# Patient Record
Sex: MALE | Race: BLACK OR AFRICAN AMERICAN | NOT HISPANIC OR LATINO | Employment: FULL TIME | URBAN - METROPOLITAN AREA
[De-identification: names, ages, dates, MRNs, and addresses within clinical notes are randomized per-mention and may not be internally consistent; named-entity substitution may affect disease eponyms.]

---

## 2020-04-28 ENCOUNTER — TELEMEDICINE (OUTPATIENT)
Dept: FAMILY MEDICINE CLINIC | Facility: CLINIC | Age: 35
End: 2020-04-28

## 2020-04-28 DIAGNOSIS — R68.83 CHILLS: ICD-10-CM

## 2020-04-28 DIAGNOSIS — G43.809 OTHER MIGRAINE WITHOUT STATUS MIGRAINOSUS, NOT INTRACTABLE: ICD-10-CM

## 2020-04-28 DIAGNOSIS — J06.9 UPPER RESPIRATORY TRACT INFECTION, UNSPECIFIED TYPE: Primary | ICD-10-CM

## 2020-04-28 PROBLEM — G43.909 MIGRAINE: Status: ACTIVE | Noted: 2020-04-28

## 2020-04-28 PROCEDURE — 99213 OFFICE O/P EST LOW 20 MIN: CPT | Performed by: FAMILY MEDICINE

## 2020-04-28 NOTE — PROGRESS NOTES
Subjective   Emerson Rhodes Jr. is a 34 y.o. male.     Consent given  Visit via [employer]  Time 20 min    CC: sick since Saturday 4 days ago, felt like slight fever nausea, fatigued, indigestion, had chills. No vomiting, but migraine, sleeping a lot. Some sinus congestion.    History of Present Illness   : sick since Saturday 4 days ago, felt like slight fever nausea, fatigued, indigestion, had chills. No vomiting, but migraine, sleeping a lot. Some sinus congestion.    The following portions of the patient's history were reviewed and updated as appropriate: allergies, current medications, past family history, past medical history, past social history, past surgical history and problem list.    History reviewed. No pertinent past medical history.    History reviewed. No pertinent surgical history.    Family History   Problem Relation Age of Onset   • No Known Problems Mother        Social History     Socioeconomic History   • Marital status: Single     Spouse name: Not on file   • Number of children: Not on file   • Years of education: Not on file   • Highest education level: Not on file   Tobacco Use   • Smoking status: Never Smoker   • Smokeless tobacco: Never Used   Substance and Sexual Activity   • Alcohol use: Never     Frequency: Never   • Drug use: Never   • Sexual activity: Yes       No current outpatient medications on file prior to visit.     No current facility-administered medications on file prior to visit.        Review of Systems   Constitutional: Positive for chills, fatigue and fever.   Gastrointestinal: Positive for nausea.       No results found for this or any previous visit (from the past 4704 hour(s)).  Objective   There were no vitals filed for this visit.  There is no height or weight on file to calculate BMI.  Physical Exam      Assessment/Plan   Diagnoses and all orders for this visit:    Upper respiratory tract infection, unspecified type    Other migraine without status migrainosus, not  intractable    Chills    Pt was recommended for only symptomatic treatment OTC, Tylenol recommended.    Recommended to be tested for COViD 19 at a testing site.

## 2020-06-23 ENCOUNTER — OFFICE VISIT (OUTPATIENT)
Dept: FAMILY MEDICINE CLINIC | Facility: CLINIC | Age: 35
End: 2020-06-23

## 2020-06-23 VITALS
WEIGHT: 163.5 LBS | HEIGHT: 65 IN | DIASTOLIC BLOOD PRESSURE: 78 MMHG | OXYGEN SATURATION: 98 % | BODY MASS INDEX: 27.24 KG/M2 | TEMPERATURE: 97.5 F | SYSTOLIC BLOOD PRESSURE: 120 MMHG | HEART RATE: 68 BPM

## 2020-06-23 DIAGNOSIS — Z00.00 HEALTH MAINTENANCE EXAMINATION: Primary | ICD-10-CM

## 2020-06-23 PROCEDURE — 99395 PREV VISIT EST AGE 18-39: CPT | Performed by: FAMILY MEDICINE

## 2020-06-23 RX ORDER — NIACIN 100 MG
100 TABLET ORAL DAILY
COMMUNITY
End: 2020-06-23

## 2020-06-23 NOTE — PROGRESS NOTES
"Subjective   Emerson Rhodes Jr. is a 34 y.o. male.     Chief Complaint   Patient presents with   • biometric screening     discuss the numbers.       History of Present Illness     HERE FOR BIOMETRIC SCREENING,  DOING WELL  NO C/O  The following portions of the patient's history were reviewed and updated as appropriate: allergies, current medications, past family history, past medical history, past social history, past surgical history and problem list.    Past Medical History:   Diagnosis Date   • Hyperlipidemia        Past Surgical History:   Procedure Laterality Date   • WISDOM TOOTH EXTRACTION     • WRIST SURGERY         Family History   Problem Relation Age of Onset   • No Known Problems Mother    • No Known Problems Father        Social History     Socioeconomic History   • Marital status: Single     Spouse name: Not on file   • Number of children: Not on file   • Years of education: Not on file   • Highest education level: Not on file   Tobacco Use   • Smoking status: Never Smoker   • Smokeless tobacco: Never Used   Substance and Sexual Activity   • Alcohol use: Never     Frequency: Never   • Drug use: Never   • Sexual activity: Yes       Current Outpatient Medications on File Prior to Visit   Medication Sig Dispense Refill   • Loratadine (CLARITIN PO) Take  by mouth Daily.     • MULTIPLE VITAMINS-MINERALS PO Take  by mouth Daily.     • [DISCONTINUED] niacin 100 MG tablet Take 100 mg by mouth Daily.       No current facility-administered medications on file prior to visit.        Review of Systems   All other systems reviewed and are negative.      No results found for this or any previous visit (from the past 4704 hour(s)).  Objective   Vitals:    06/23/20 1303   BP: 120/78   Pulse: 68   Temp: 97.5 °F (36.4 °C)   SpO2: 98%   Weight: 74.2 kg (163 lb 8 oz)   Height: 165.1 cm (65\")     Body mass index is 27.21 kg/m².  Physical Exam   Constitutional: He appears well-developed and well-nourished. No distress. "   Cardiovascular: Normal rate and regular rhythm.   Pulmonary/Chest: Effort normal and breath sounds normal. No respiratory distress. He has no wheezes.   Skin: He is not diaphoretic.   Nursing note and vitals reviewed.        Assessment/Plan   Emerson was seen today for biometric screening.    Diagnoses and all orders for this visit:    Health maintenance examination  -     Comprehensive Metabolic Panel  -     Lipid Panel  -     CBC & Differential      Return in about 1 year (around 6/23/2021) for Annual physical.

## 2020-06-25 LAB
ALBUMIN SERPL-MCNC: 4.7 G/DL (ref 3.5–5.2)
ALBUMIN/GLOB SERPL: 1.6 G/DL
ALP SERPL-CCNC: 67 U/L (ref 39–117)
ALT SERPL-CCNC: 58 U/L (ref 1–41)
AST SERPL-CCNC: 26 U/L (ref 1–40)
BASOPHILS # BLD AUTO: 0.04 10*3/MM3 (ref 0–0.2)
BASOPHILS NFR BLD AUTO: 0.6 % (ref 0–1.5)
BILIRUB SERPL-MCNC: 0.8 MG/DL (ref 0.2–1.2)
BUN SERPL-MCNC: 11 MG/DL (ref 6–20)
BUN/CREAT SERPL: 10.2 (ref 7–25)
CALCIUM SERPL-MCNC: 10 MG/DL (ref 8.6–10.5)
CHLORIDE SERPL-SCNC: 100 MMOL/L (ref 98–107)
CHOLEST SERPL-MCNC: 233 MG/DL (ref 0–200)
CO2 SERPL-SCNC: 25.5 MMOL/L (ref 22–29)
CREAT SERPL-MCNC: 1.08 MG/DL (ref 0.76–1.27)
EOSINOPHIL # BLD AUTO: 0.2 10*3/MM3 (ref 0–0.4)
EOSINOPHIL NFR BLD AUTO: 2.8 % (ref 0.3–6.2)
ERYTHROCYTE [DISTWIDTH] IN BLOOD BY AUTOMATED COUNT: 13.8 % (ref 12.3–15.4)
GLOBULIN SER CALC-MCNC: 3 GM/DL
GLUCOSE SERPL-MCNC: 88 MG/DL (ref 65–99)
HCT VFR BLD AUTO: 47.2 % (ref 37.5–51)
HDLC SERPL-MCNC: 43 MG/DL (ref 40–60)
HGB BLD-MCNC: 15.6 G/DL (ref 13–17.7)
IMM GRANULOCYTES # BLD AUTO: 0.01 10*3/MM3 (ref 0–0.05)
IMM GRANULOCYTES NFR BLD AUTO: 0.1 % (ref 0–0.5)
LDLC SERPL CALC-MCNC: 166 MG/DL (ref 0–100)
LYMPHOCYTES # BLD AUTO: 3.14 10*3/MM3 (ref 0.7–3.1)
LYMPHOCYTES NFR BLD AUTO: 43.3 % (ref 19.6–45.3)
MCH RBC QN AUTO: 29.3 PG (ref 26.6–33)
MCHC RBC AUTO-ENTMCNC: 33.1 G/DL (ref 31.5–35.7)
MCV RBC AUTO: 88.6 FL (ref 79–97)
MONOCYTES # BLD AUTO: 0.63 10*3/MM3 (ref 0.1–0.9)
MONOCYTES NFR BLD AUTO: 8.7 % (ref 5–12)
NEUTROPHILS # BLD AUTO: 3.24 10*3/MM3 (ref 1.7–7)
NEUTROPHILS NFR BLD AUTO: 44.5 % (ref 42.7–76)
NRBC BLD AUTO-RTO: 0 /100 WBC (ref 0–0.2)
PLATELET # BLD AUTO: 307 10*3/MM3 (ref 140–450)
POTASSIUM SERPL-SCNC: 4.7 MMOL/L (ref 3.5–5.2)
PROT SERPL-MCNC: 7.7 G/DL (ref 6–8.5)
RBC # BLD AUTO: 5.33 10*6/MM3 (ref 4.14–5.8)
SODIUM SERPL-SCNC: 136 MMOL/L (ref 136–145)
TRIGL SERPL-MCNC: 122 MG/DL (ref 0–150)
VLDLC SERPL CALC-MCNC: 24.4 MG/DL
WBC # BLD AUTO: 7.26 10*3/MM3 (ref 3.4–10.8)

## 2021-04-16 ENCOUNTER — BULK ORDERING (OUTPATIENT)
Dept: CASE MANAGEMENT | Facility: OTHER | Age: 36
End: 2021-04-16

## 2021-04-16 DIAGNOSIS — Z23 IMMUNIZATION DUE: ICD-10-CM

## 2021-06-11 ENCOUNTER — IMMUNIZATION (OUTPATIENT)
Dept: VACCINE CLINIC | Facility: HOSPITAL | Age: 36
End: 2021-06-11

## 2021-06-11 PROCEDURE — 0001A: CPT | Performed by: INTERNAL MEDICINE

## 2021-06-11 PROCEDURE — 91300 HC SARSCOV02 VAC 30MCG/0.3ML IM: CPT | Performed by: INTERNAL MEDICINE

## 2021-06-15 ENCOUNTER — OFFICE VISIT (OUTPATIENT)
Dept: FAMILY MEDICINE CLINIC | Facility: CLINIC | Age: 36
End: 2021-06-15

## 2021-06-15 VITALS
SYSTOLIC BLOOD PRESSURE: 113 MMHG | HEART RATE: 86 BPM | HEIGHT: 65 IN | WEIGHT: 146 LBS | OXYGEN SATURATION: 98 % | DIASTOLIC BLOOD PRESSURE: 76 MMHG | TEMPERATURE: 98.9 F | BODY MASS INDEX: 24.32 KG/M2

## 2021-06-15 DIAGNOSIS — R07.89 ATYPICAL CHEST PAIN: ICD-10-CM

## 2021-06-15 DIAGNOSIS — Z11.59 ENCOUNTER FOR HEPATITIS C SCREENING TEST FOR LOW RISK PATIENT: ICD-10-CM

## 2021-06-15 DIAGNOSIS — H61.23 BILATERAL IMPACTED CERUMEN: ICD-10-CM

## 2021-06-15 DIAGNOSIS — Z00.00 HEALTH MAINTENANCE EXAMINATION: Primary | ICD-10-CM

## 2021-06-15 DIAGNOSIS — R07.2 PRECORDIAL PAIN: ICD-10-CM

## 2021-06-15 PROBLEM — J30.81 ALLERGIC RHINITIS DUE TO CAT HAIR: Status: ACTIVE | Noted: 2021-06-15

## 2021-06-15 PROBLEM — Z91.09 POLLEN ALLERGIES: Status: ACTIVE | Noted: 2021-06-15

## 2021-06-15 PROCEDURE — 69209 REMOVE IMPACTED EAR WAX UNI: CPT | Performed by: FAMILY MEDICINE

## 2021-06-15 PROCEDURE — 93000 ELECTROCARDIOGRAM COMPLETE: CPT | Performed by: FAMILY MEDICINE

## 2021-06-15 PROCEDURE — 99395 PREV VISIT EST AGE 18-39: CPT | Performed by: FAMILY MEDICINE

## 2021-06-15 PROCEDURE — 99213 OFFICE O/P EST LOW 20 MIN: CPT | Performed by: FAMILY MEDICINE

## 2021-06-15 NOTE — PROGRESS NOTES
"Emerson Rhodes Jr. is here today for an annual physical exam.     Eating a healthy diet. Exercising routinely.  yes  Sexual and gender orientation:h/s  Practicing safe sex?:yes    PHQ-2 Depression Screening  Little interest or pleasure in doing things? 0   Feeling down, depressed, or hopeless? 0   PHQ-2 Total Score 0        I have reviewed the patient's medical, family, and social history in detail and updated the computerized patient record.    Screening history:  Colonoscopy - n/a  Prostate - n/a  Metabolic - nolmal  Dental: up to date  Vision: due  Health Maintenance   Topic Date Due   • TDAP/TD VACCINES (2 - Tdap) 02/19/2008   • HEPATITIS C SCREENING  Never done   • LIPID PANEL  06/24/2021   • COVID-19 Vaccine (2 - Pfizer 2-dose series) 07/02/2021   • INFLUENZA VACCINE  08/01/2021   • ANNUAL PHYSICAL  06/16/2022   • Pneumococcal Vaccine 0-64  Aged Out         Review of Systems   Constitutional: Negative.    Respiratory: Positive for chest tightness.    Cardiovascular: Positive for palpitations.       /76   Pulse 86   Temp 98.9 °F (37.2 °C)   Ht 165.1 cm (65\")   Wt 66.2 kg (146 lb)   SpO2 98%   BMI 24.30 kg/m²      Physical Exam    Vital signs reviewed.  General appearance: No acute distress  Eyes: conjunctiva clear without erythema; pupils equally round and reactive  ENT: external ears and nose normal; hearing normal, oropharynx clear  Neck: supple; no thyromegaly  CV: normal rate and rhythm; no peripheral edema  Respiratory: normal respiratory effort; lungs clear to auscultation bilaterally  MSK: normal gait and station; no focal joint deformity or swelling  Skin: no rash or wounds; normal turgor  Neuro: cranial nerves 2-12 grossly intact; normal sensation to light touch  Psych: mood and affect normal; recent and remote memory intact    No visits with results within 2 Week(s) from this visit.   Latest known visit with results is:   Office Visit on 06/23/2020   Component Date Value Ref Range Status "   • Glucose 06/24/2020 88  65 - 99 mg/dL Final   • BUN 06/24/2020 11  6 - 20 mg/dL Final   • Creatinine 06/24/2020 1.08  0.76 - 1.27 mg/dL Final   • eGFR Non  Am 06/24/2020 79  >60 mL/min/1.73 Final   • eGFR African Am 06/24/2020 95  >60 mL/min/1.73 Final   • BUN/Creatinine Ratio 06/24/2020 10.2  7.0 - 25.0 Final   • Sodium 06/24/2020 136  136 - 145 mmol/L Final   • Potassium 06/24/2020 4.7  3.5 - 5.2 mmol/L Final   • Chloride 06/24/2020 100  98 - 107 mmol/L Final   • Total CO2 06/24/2020 25.5  22.0 - 29.0 mmol/L Final   • Calcium 06/24/2020 10.0  8.6 - 10.5 mg/dL Final   • Total Protein 06/24/2020 7.7  6.0 - 8.5 g/dL Final   • Albumin 06/24/2020 4.70  3.50 - 5.20 g/dL Final   • Globulin 06/24/2020 3.0  gm/dL Final   • A/G Ratio 06/24/2020 1.6  g/dL Final   • Total Bilirubin 06/24/2020 0.8  0.2 - 1.2 mg/dL Final   • Alkaline Phosphatase 06/24/2020 67  39 - 117 U/L Final   • AST (SGOT) 06/24/2020 26  1 - 40 U/L Final   • ALT (SGPT) 06/24/2020 58* 1 - 41 U/L Final   • Total Cholesterol 06/24/2020 233* 0 - 200 mg/dL Final   • Triglycerides 06/24/2020 122  0 - 150 mg/dL Final   • HDL Cholesterol 06/24/2020 43  40 - 60 mg/dL Final   • VLDL Cholesterol 06/24/2020 24.4  mg/dL Final   • LDL Cholesterol  06/24/2020 166* 0 - 100 mg/dL Final   • WBC 06/24/2020 7.26  3.40 - 10.80 10*3/mm3 Final   • RBC 06/24/2020 5.33  4.14 - 5.80 10*6/mm3 Final   • Hemoglobin 06/24/2020 15.6  13.0 - 17.7 g/dL Final   • Hematocrit 06/24/2020 47.2  37.5 - 51.0 % Final   • MCV 06/24/2020 88.6  79.0 - 97.0 fL Final   • MCH 06/24/2020 29.3  26.6 - 33.0 pg Final   • MCHC 06/24/2020 33.1  31.5 - 35.7 g/dL Final   • RDW 06/24/2020 13.8  12.3 - 15.4 % Final   • Platelets 06/24/2020 307  140 - 450 10*3/mm3 Final   • Neutrophil Rel % 06/24/2020 44.5  42.7 - 76.0 % Final   • Lymphocyte Rel % 06/24/2020 43.3  19.6 - 45.3 % Final   • Monocyte Rel % 06/24/2020 8.7  5.0 - 12.0 % Final   • Eosinophil Rel % 06/24/2020 2.8  0.3 - 6.2 % Final   • Basophil  Rel % 06/24/2020 0.6  0.0 - 1.5 % Final   • Neutrophils Absolute 06/24/2020 3.24  1.70 - 7.00 10*3/mm3 Final   • Lymphocytes Absolute 06/24/2020 3.14* 0.70 - 3.10 10*3/mm3 Final   • Monocytes Absolute 06/24/2020 0.63  0.10 - 0.90 10*3/mm3 Final   • Eosinophils Absolute 06/24/2020 0.20  0.00 - 0.40 10*3/mm3 Final   • Basophils Absolute 06/24/2020 0.04  0.00 - 0.20 10*3/mm3 Final   • Immature Granulocyte Rel % 06/24/2020 0.1  0.0 - 0.5 % Final   • Immature Grans Absolute 06/24/2020 0.01  0.00 - 0.05 10*3/mm3 Final   • nRBC 06/24/2020 0.0  0.0 - 0.2 /100 WBC Final          Current Outpatient Medications:   •  Loratadine (CLARITIN PO), Take  by mouth Daily., Disp: , Rfl:   •  MULTIPLE VITAMINS-MINERALS PO, Take  by mouth Daily., Disp: , Rfl:     Diagnoses and all orders for this visit:    1. Health maintenance examination (Primary)  -     Comprehensive Metabolic Panel  -     Lipid Panel  -     CBC & Differential  -     Hepatitis C Antibody    2. Encounter for hepatitis C screening test for low risk patient  -     Hepatitis C Antibody    3. Precordial pain  -     ECG 12 Lead    4. Atypical chest pain  -     ECG 12 Lead    5. Bilateral impacted cerumen  -     Ear Cerumen Removal    Other orders  -     Cancel: ECG 12 Lead      ECG 12 Lead    Date/Time: 6/15/2021 9:42 AM  Performed by: Mia Degroot MD  Authorized by: Mia Degroot MD   Comparison: not compared with previous ECG   Previous ECG: no previous ECG available  Rhythm: sinus rhythm    Clinical impression: normal ECG      Ear Cerumen Removal    Date/Time: 6/15/2021 9:43 AM  Performed by: Mia Degroot MD  Authorized by: Mia Degroot MD   Location details: right ear and left ear  Comments: Tolerated procedure well.  Canals are clear.    Procedure type: irrigation       Preventive guidance given  Encourage healthy diet and exercise.  Encourage patient to stay up to date on screening examinations as indicated based on age and risk factors. F/U  yearly.

## 2021-06-15 NOTE — PROGRESS NOTES
Subjective   Emerson Rhodes Jr. is a 35 y.o. male.     Chief Complaint   Patient presents with   • Follow-up       History of Present Illness     Besides coming today for health maintenance patient is complaining on strange sensations in his anterior chest.  He cannot characterize them they are not associated with physical activity.  But it is anterior chest pain.  No family history of heart disease or early heart attacks.    The following portions of the patient's history were reviewed and updated as appropriate: allergies, current medications, past family history, past medical history, past social history, past surgical history and problem list.    Past Medical History:   Diagnosis Date   • Hyperlipidemia        Past Surgical History:   Procedure Laterality Date   • WISDOM TOOTH EXTRACTION     • WRIST SURGERY         Family History   Problem Relation Age of Onset   • No Known Problems Mother    • No Known Problems Father        Social History     Socioeconomic History   • Marital status: Single     Spouse name: Not on file   • Number of children: Not on file   • Years of education: Not on file   • Highest education level: Not on file   Tobacco Use   • Smoking status: Never Smoker   • Smokeless tobacco: Never Used   Substance and Sexual Activity   • Alcohol use: Never   • Drug use: Never   • Sexual activity: Yes       Current Outpatient Medications on File Prior to Visit   Medication Sig Dispense Refill   • Loratadine (CLARITIN PO) Take  by mouth Daily.     • MULTIPLE VITAMINS-MINERALS PO Take  by mouth Daily.       No current facility-administered medications on file prior to visit.       Review of Systems   Respiratory: Positive for chest tightness.    Cardiovascular: Positive for chest pain.       No results found for this or any previous visit (from the past 4704 hour(s)).  Objective   Vitals:    06/15/21 0921 06/15/21 1019   BP: 131/77 113/76   Pulse: 86    Temp: 98.9 °F (37.2 °C)    SpO2: 98%    Weight: 66.2  "kg (146 lb)    Height: 165.1 cm (65\")      Body mass index is 24.3 kg/m².  Physical Exam  Vitals and nursing note reviewed.   Constitutional:       General: He is not in acute distress.     Appearance: He is well-developed. He is not diaphoretic.   Cardiovascular:      Rate and Rhythm: Normal rate and regular rhythm.   Pulmonary:      Effort: Pulmonary effort is normal. No respiratory distress.      Breath sounds: Normal breath sounds. No wheezing.           Diagnoses and all orders for this visit:    1. Health maintenance examination (Primary)  -     Comprehensive Metabolic Panel  -     Lipid Panel  -     CBC & Differential  -     Hepatitis C Antibody    2. Encounter for hepatitis C screening test for low risk patient  -     Hepatitis C Antibody    3. Precordial pain  -     ECG 12 Lead    4. Atypical chest pain  -     ECG 12 Lead    5. Bilateral impacted cerumen  -     Ear Cerumen Removal    Other orders  -     Cancel: ECG 12 Lead      Return if symptoms worsen or fail to improve.          "

## 2021-06-16 LAB
ALBUMIN SERPL-MCNC: 4.7 G/DL (ref 3.5–5.2)
ALBUMIN/GLOB SERPL: 1.9 G/DL
ALP SERPL-CCNC: 78 U/L (ref 39–117)
ALT SERPL-CCNC: 50 U/L (ref 1–41)
AST SERPL-CCNC: 22 U/L (ref 1–40)
BASOPHILS # BLD AUTO: 0.02 10*3/MM3 (ref 0–0.2)
BASOPHILS NFR BLD AUTO: 0.3 % (ref 0–1.5)
BILIRUB SERPL-MCNC: 0.6 MG/DL (ref 0–1.2)
BUN SERPL-MCNC: 17 MG/DL (ref 6–20)
BUN/CREAT SERPL: 15.6 (ref 7–25)
CALCIUM SERPL-MCNC: 9.7 MG/DL (ref 8.6–10.5)
CHLORIDE SERPL-SCNC: 101 MMOL/L (ref 98–107)
CHOLEST SERPL-MCNC: 159 MG/DL (ref 0–200)
CO2 SERPL-SCNC: 27.8 MMOL/L (ref 22–29)
CREAT SERPL-MCNC: 1.09 MG/DL (ref 0.76–1.27)
EOSINOPHIL # BLD AUTO: 0.15 10*3/MM3 (ref 0–0.4)
EOSINOPHIL NFR BLD AUTO: 2.5 % (ref 0.3–6.2)
ERYTHROCYTE [DISTWIDTH] IN BLOOD BY AUTOMATED COUNT: 13.1 % (ref 12.3–15.4)
GLOBULIN SER CALC-MCNC: 2.5 GM/DL
GLUCOSE SERPL-MCNC: 78 MG/DL (ref 65–99)
HCT VFR BLD AUTO: 46.3 % (ref 37.5–51)
HCV AB S/CO SERPL IA: <0.1 S/CO RATIO (ref 0–0.9)
HDLC SERPL-MCNC: 44 MG/DL (ref 40–60)
HGB BLD-MCNC: 15.1 G/DL (ref 13–17.7)
IMM GRANULOCYTES # BLD AUTO: 0.01 10*3/MM3 (ref 0–0.05)
IMM GRANULOCYTES NFR BLD AUTO: 0.2 % (ref 0–0.5)
LDLC SERPL CALC-MCNC: 98 MG/DL (ref 0–100)
LYMPHOCYTES # BLD AUTO: 2.45 10*3/MM3 (ref 0.7–3.1)
LYMPHOCYTES NFR BLD AUTO: 41 % (ref 19.6–45.3)
MCH RBC QN AUTO: 29 PG (ref 26.6–33)
MCHC RBC AUTO-ENTMCNC: 32.6 G/DL (ref 31.5–35.7)
MCV RBC AUTO: 89 FL (ref 79–97)
MONOCYTES # BLD AUTO: 0.48 10*3/MM3 (ref 0.1–0.9)
MONOCYTES NFR BLD AUTO: 8 % (ref 5–12)
NEUTROPHILS # BLD AUTO: 2.87 10*3/MM3 (ref 1.7–7)
NEUTROPHILS NFR BLD AUTO: 48 % (ref 42.7–76)
NRBC BLD AUTO-RTO: 0 /100 WBC (ref 0–0.2)
PLATELET # BLD AUTO: 265 10*3/MM3 (ref 140–450)
POTASSIUM SERPL-SCNC: 4.1 MMOL/L (ref 3.5–5.2)
PROT SERPL-MCNC: 7.2 G/DL (ref 6–8.5)
RBC # BLD AUTO: 5.2 10*6/MM3 (ref 4.14–5.8)
SODIUM SERPL-SCNC: 140 MMOL/L (ref 136–145)
TRIGL SERPL-MCNC: 88 MG/DL (ref 0–150)
VLDLC SERPL CALC-MCNC: 17 MG/DL (ref 5–40)
WBC # BLD AUTO: 5.98 10*3/MM3 (ref 3.4–10.8)

## 2021-07-02 ENCOUNTER — IMMUNIZATION (OUTPATIENT)
Dept: VACCINE CLINIC | Facility: HOSPITAL | Age: 36
End: 2021-07-02

## 2021-07-02 PROCEDURE — 0002A: CPT | Performed by: INTERNAL MEDICINE

## 2021-07-02 PROCEDURE — 91300 HC SARSCOV02 VAC 30MCG/0.3ML IM: CPT | Performed by: INTERNAL MEDICINE

## 2022-01-05 ENCOUNTER — APPOINTMENT (OUTPATIENT)
Dept: VACCINE CLINIC | Facility: HOSPITAL | Age: 37
End: 2022-01-05

## 2022-01-08 ENCOUNTER — IMMUNIZATION (OUTPATIENT)
Dept: VACCINE CLINIC | Facility: HOSPITAL | Age: 37
End: 2022-01-08

## 2022-01-08 PROCEDURE — 0004A HC ADM SARSCOV2 30MCG/0.3ML BOOSTER: CPT | Performed by: INTERNAL MEDICINE

## 2022-01-08 PROCEDURE — 91300 HC SARSCOV02 VAC 30MCG/0.3ML IM: CPT | Performed by: INTERNAL MEDICINE

## 2022-01-27 ENCOUNTER — OFFICE VISIT (OUTPATIENT)
Dept: FAMILY MEDICINE CLINIC | Facility: CLINIC | Age: 37
End: 2022-01-27

## 2022-01-27 VITALS
WEIGHT: 150 LBS | DIASTOLIC BLOOD PRESSURE: 80 MMHG | BODY MASS INDEX: 24.99 KG/M2 | TEMPERATURE: 98.4 F | OXYGEN SATURATION: 99 % | HEART RATE: 94 BPM | HEIGHT: 65 IN | SYSTOLIC BLOOD PRESSURE: 112 MMHG

## 2022-01-27 DIAGNOSIS — Z00.00 HEALTH MAINTENANCE EXAMINATION: Primary | ICD-10-CM

## 2022-01-27 PROCEDURE — 99395 PREV VISIT EST AGE 18-39: CPT | Performed by: FAMILY MEDICINE

## 2022-01-27 NOTE — PROGRESS NOTES
"Emerson Rhodes JrAntoine is here today for an annual physical exam.     Eating a healthy diet. Exercising routinely.    Sexual and gender orientation:  Practicing safe sex?:    PHQ-2 Depression Screening  Little interest or pleasure in doing things?  0   Feeling down, depressed, or hopeless?  0   PHQ-2 Total Score  0        I have reviewed the patient's medical, family, and social history in detail and updated the computerized patient record.    Screening history:  Colonoscopy - n/a  Prostate - n/a  Metabolic - nl  Vision due  Dental up to date    Health Maintenance   Topic Date Due   • TDAP/TD VACCINES (2 - Tdap) 02/19/2008   • INFLUENZA VACCINE  08/01/2021   • LIPID PANEL  06/15/2022   • ANNUAL PHYSICAL  06/16/2022   • HEPATITIS C SCREENING  Completed   • COVID-19 Vaccine  Completed   • Pneumococcal Vaccine 0-64  Aged Out       Review of Systems   Constitutional: Negative.        /80 (BP Location: Left arm, Patient Position: Sitting)   Pulse 94   Temp 98.4 °F (36.9 °C)   Ht 165.1 cm (65\")   Wt 68 kg (150 lb)   SpO2 99%   BMI 24.96 kg/m²      Physical Exam    Vital signs reviewed.  General appearance: No acute distress  Eyes: conjunctiva clear without erythema; pupils equally round and reactive  ENT: external ears and nose normal; hearing normal, oropharynx clear  Neck: supple; no thyromegaly  CV: normal rate and rhythm; no peripheral edema  Respiratory: normal respiratory effort; lungs clear to auscultation bilaterally  MSK: normal gait and station; no focal joint deformity or swelling  Skin: no rash or wounds; normal turgor  Neuro: cranial nerves 2-12 grossly intact; normal sensation to light touch  Psych: mood and affect normal; recent and remote memory intact    No visits with results within 2 Week(s) from this visit.   Latest known visit with results is:   Office Visit on 06/15/2021   Component Date Value Ref Range Status   • Glucose 06/15/2021 78  65 - 99 mg/dL Final   • BUN 06/15/2021 17  6 - 20 mg/dL " Final   • Creatinine 06/15/2021 1.09  0.76 - 1.27 mg/dL Final   • eGFR Non  Am 06/15/2021 77  >60 mL/min/1.73 Final    Comment: GFR Normal >60  Chronic Kidney Disease <60  Kidney Failure <15     • eGFR  Am 06/15/2021 94  >60 mL/min/1.73 Final   • BUN/Creatinine Ratio 06/15/2021 15.6  7.0 - 25.0 Final   • Sodium 06/15/2021 140  136 - 145 mmol/L Final   • Potassium 06/15/2021 4.1  3.5 - 5.2 mmol/L Final   • Chloride 06/15/2021 101  98 - 107 mmol/L Final   • Total CO2 06/15/2021 27.8  22.0 - 29.0 mmol/L Final   • Calcium 06/15/2021 9.7  8.6 - 10.5 mg/dL Final   • Total Protein 06/15/2021 7.2  6.0 - 8.5 g/dL Final   • Albumin 06/15/2021 4.70  3.50 - 5.20 g/dL Final   • Globulin 06/15/2021 2.5  gm/dL Final   • A/G Ratio 06/15/2021 1.9  g/dL Final   • Total Bilirubin 06/15/2021 0.6  0.0 - 1.2 mg/dL Final   • Alkaline Phosphatase 06/15/2021 78  39 - 117 U/L Final   • AST (SGOT) 06/15/2021 22  1 - 40 U/L Final   • ALT (SGPT) 06/15/2021 50* 1 - 41 U/L Final   • Total Cholesterol 06/15/2021 159  0 - 200 mg/dL Final    Comment: Cholesterol Reference Ranges  (U.S. Department of Health and Human Services ATP III  Classifications)  Desirable          <200 mg/dL  Borderline High    200-239 mg/dL  High Risk          >240 mg/dL  Triglyceride Reference Ranges  (U.S. Department of Health and Human Services ATP III  Classifications)  Normal           <150 mg/dL  Borderline High  150-199 mg/dL  High             200-499 mg/dL  Very High        >500 mg/dL  HDL Reference Ranges  (U.S. Department of Health and Human Services ATP III  Classifcations)  Low     <40 mg/dl (major risk factor for CHD)  High    >60 mg/dl ('negative' risk factor for CHD)  LDL Reference Ranges  (U.S. Department of Health and Human Services ATP III  Classifcations)  Optimal          <100 mg/dL  Near Optimal     100-129 mg/dL  Borderline High  130-159 mg/dL  High             160-189 mg/dL  Very High        >189 mg/dL     • Triglycerides 06/15/2021 88  0  - 150 mg/dL Final   • HDL Cholesterol 06/15/2021 44  40 - 60 mg/dL Final   • VLDL Cholesterol Jeremiah 06/15/2021 17  5 - 40 mg/dL Final   • LDL Chol Calc ([facility]) 06/15/2021 98  0 - 100 mg/dL Final   • WBC 06/15/2021 5.98  3.40 - 10.80 10*3/mm3 Final   • RBC 06/15/2021 5.20  4.14 - 5.80 10*6/mm3 Final   • Hemoglobin 06/15/2021 15.1  13.0 - 17.7 g/dL Final   • Hematocrit 06/15/2021 46.3  37.5 - 51.0 % Final   • MCV 06/15/2021 89.0  79.0 - 97.0 fL Final   • MCH 06/15/2021 29.0  26.6 - 33.0 pg Final   • MCHC 06/15/2021 32.6  31.5 - 35.7 g/dL Final   • RDW 06/15/2021 13.1  12.3 - 15.4 % Final   • Platelets 06/15/2021 265  140 - 450 10*3/mm3 Final   • Neutrophil Rel % 06/15/2021 48.0  42.7 - 76.0 % Final   • Lymphocyte Rel % 06/15/2021 41.0  19.6 - 45.3 % Final   • Monocyte Rel % 06/15/2021 8.0  5.0 - 12.0 % Final   • Eosinophil Rel % 06/15/2021 2.5  0.3 - 6.2 % Final   • Basophil Rel % 06/15/2021 0.3  0.0 - 1.5 % Final   • Neutrophils Absolute 06/15/2021 2.87  1.70 - 7.00 10*3/mm3 Final   • Lymphocytes Absolute 06/15/2021 2.45  0.70 - 3.10 10*3/mm3 Final   • Monocytes Absolute 06/15/2021 0.48  0.10 - 0.90 10*3/mm3 Final   • Eosinophils Absolute 06/15/2021 0.15  0.00 - 0.40 10*3/mm3 Final   • Basophils Absolute 06/15/2021 0.02  0.00 - 0.20 10*3/mm3 Final   • Immature Granulocyte Rel % 06/15/2021 0.2  0.0 - 0.5 % Final   • Immature Grans Absolute 06/15/2021 0.01  0.00 - 0.05 10*3/mm3 Final   • nRBC 06/15/2021 0.0  0.0 - 0.2 /100 WBC Final   • Hep C Virus Ab 06/15/2021 <0.1  0.0 - 0.9 s/co ratio Final    Comment:                                   Negative:     < 0.8                               Indeterminate: 0.8 - 0.9                                    Positive:     > 0.9   The CDC recommends that a positive HCV antibody result   be followed up with a HCV Nucleic Acid Amplification   test (493738).            Current Outpatient Medications:   •  Loratadine (CLARITIN PO), Take  by mouth Daily., Disp: , Rfl:   •  MULTIPLE  VITAMINS-MINERALS PO, Take  by mouth Daily., Disp: , Rfl:     Diagnoses and all orders for this visit:    1. Health maintenance examination (Primary)  -     Comprehensive Metabolic Panel  -     Lipid Panel  -     CBC & Differential      Preventive guidance given  Encourage healthy diet and exercise.  Encourage patient to stay up to date on screening examinations as indicated based on age and risk factors. F/U yearly.

## 2022-01-28 LAB
ALBUMIN SERPL-MCNC: 4.5 G/DL (ref 4–5)
ALBUMIN/GLOB SERPL: 1.6 {RATIO} (ref 1.2–2.2)
ALP SERPL-CCNC: 72 IU/L (ref 44–121)
ALT SERPL-CCNC: 25 IU/L (ref 0–44)
AST SERPL-CCNC: 19 IU/L (ref 0–40)
BASOPHILS # BLD AUTO: 0 X10E3/UL (ref 0–0.2)
BASOPHILS NFR BLD AUTO: 1 %
BILIRUB SERPL-MCNC: 0.7 MG/DL (ref 0–1.2)
BUN SERPL-MCNC: 29 MG/DL (ref 6–20)
BUN/CREAT SERPL: 24 (ref 9–20)
CALCIUM SERPL-MCNC: 9.5 MG/DL (ref 8.7–10.2)
CHLORIDE SERPL-SCNC: 100 MMOL/L (ref 96–106)
CHOLEST SERPL-MCNC: 187 MG/DL (ref 100–199)
CO2 SERPL-SCNC: 27 MMOL/L (ref 20–29)
CREAT SERPL-MCNC: 1.23 MG/DL (ref 0.76–1.27)
EOSINOPHIL # BLD AUTO: 0.1 X10E3/UL (ref 0–0.4)
EOSINOPHIL NFR BLD AUTO: 2 %
ERYTHROCYTE [DISTWIDTH] IN BLOOD BY AUTOMATED COUNT: 13 % (ref 11.6–15.4)
GLOBULIN SER CALC-MCNC: 2.8 G/DL (ref 1.5–4.5)
GLUCOSE SERPL-MCNC: 88 MG/DL (ref 65–99)
HCT VFR BLD AUTO: 48.3 % (ref 37.5–51)
HDLC SERPL-MCNC: 48 MG/DL
HGB BLD-MCNC: 15.5 G/DL (ref 13–17.7)
IMM GRANULOCYTES # BLD AUTO: 0 X10E3/UL (ref 0–0.1)
IMM GRANULOCYTES NFR BLD AUTO: 0 %
LDLC SERPL CALC-MCNC: 125 MG/DL (ref 0–99)
LYMPHOCYTES # BLD AUTO: 2.2 X10E3/UL (ref 0.7–3.1)
LYMPHOCYTES NFR BLD AUTO: 35 %
MCH RBC QN AUTO: 28.5 PG (ref 26.6–33)
MCHC RBC AUTO-ENTMCNC: 32.1 G/DL (ref 31.5–35.7)
MCV RBC AUTO: 89 FL (ref 79–97)
MONOCYTES # BLD AUTO: 0.6 X10E3/UL (ref 0.1–0.9)
MONOCYTES NFR BLD AUTO: 9 %
NEUTROPHILS # BLD AUTO: 3.5 X10E3/UL (ref 1.4–7)
NEUTROPHILS NFR BLD AUTO: 53 %
PLATELET # BLD AUTO: 314 X10E3/UL (ref 150–450)
POTASSIUM SERPL-SCNC: 4.5 MMOL/L (ref 3.5–5.2)
PROT SERPL-MCNC: 7.3 G/DL (ref 6–8.5)
RBC # BLD AUTO: 5.43 X10E6/UL (ref 4.14–5.8)
SODIUM SERPL-SCNC: 139 MMOL/L (ref 134–144)
TRIGL SERPL-MCNC: 74 MG/DL (ref 0–149)
VLDLC SERPL CALC-MCNC: 14 MG/DL (ref 5–40)
WBC # BLD AUTO: 6.5 X10E3/UL (ref 3.4–10.8)

## 2022-10-11 ENCOUNTER — TELEPHONE (OUTPATIENT)
Dept: FAMILY MEDICINE CLINIC | Facility: CLINIC | Age: 37
End: 2022-10-11

## 2022-10-11 ENCOUNTER — OFFICE VISIT (OUTPATIENT)
Dept: FAMILY MEDICINE CLINIC | Facility: CLINIC | Age: 37
End: 2022-10-11

## 2022-10-11 VITALS
BODY MASS INDEX: 25.62 KG/M2 | SYSTOLIC BLOOD PRESSURE: 111 MMHG | HEART RATE: 82 BPM | DIASTOLIC BLOOD PRESSURE: 76 MMHG | TEMPERATURE: 96.9 F | HEIGHT: 65 IN | WEIGHT: 153.8 LBS | OXYGEN SATURATION: 98 %

## 2022-10-11 DIAGNOSIS — Z23 IMMUNIZATION DUE: ICD-10-CM

## 2022-10-11 DIAGNOSIS — E78.5 DYSLIPIDEMIA: Primary | ICD-10-CM

## 2022-10-11 PROCEDURE — 90686 IIV4 VACC NO PRSV 0.5 ML IM: CPT | Performed by: FAMILY MEDICINE

## 2022-10-11 PROCEDURE — 90471 IMMUNIZATION ADMIN: CPT | Performed by: FAMILY MEDICINE

## 2022-10-11 PROCEDURE — 99213 OFFICE O/P EST LOW 20 MIN: CPT | Performed by: FAMILY MEDICINE

## 2022-10-11 NOTE — TELEPHONE ENCOUNTER
Caller: Emerson Rhodes Jr.    Relationship: Self    Best call back number: [phone]    What form or medical record are you requesting: OFF WORK NOTE FOR TODAY    Who is requesting this form or medical record from you: WORK    How would you like to receive the form or medical records (pick-up, mail, fax): [employer]    Timeframe paperwork needed: ASAP    Additional notes: PLEASE CALL WITH ANY FURTHER QUESTIONS AND TO ADVISE WHEN UPLOADED

## 2022-10-11 NOTE — PROGRESS NOTES
"Subjective   Emerson Rhodes Jr. is a 36 y.o. male.     Chief Complaint   Patient presents with   • Hyperlipidemia       History of Present Illness     Follow-up on mildly elevated cholesterol, LDL.  Patient is worried that he has been less accurate with his diet eating a lot of sweets and he wants to have his blood work checked today.  His blood pressure today is a little bit on borderline elevated side however repeated blood pressure was normal    The following portions of the patient's history were reviewed and updated as appropriate: allergies, current medications, past family history, past medical history, past social history, past surgical history and problem list.    Past Medical History:   Diagnosis Date   • Hyperlipidemia        Past Surgical History:   Procedure Laterality Date   • WISDOM TOOTH EXTRACTION     • WRIST SURGERY         Family History   Problem Relation Age of Onset   • No Known Problems Mother    • No Known Problems Father        Social History     Socioeconomic History   • Marital status: Single   Tobacco Use   • Smoking status: Never   • Smokeless tobacco: Never   Substance and Sexual Activity   • Alcohol use: Never   • Drug use: Never   • Sexual activity: Yes       Current Outpatient Medications on File Prior to Visit   Medication Sig Dispense Refill   • Loratadine (CLARITIN PO) Take  by mouth Daily.     • MULTIPLE VITAMINS-MINERALS PO Take  by mouth Daily.       No current facility-administered medications on file prior to visit.       Review of Systems   Constitutional: Negative.        No results found for this or any previous visit (from the past 4704 hour(s)).  Objective   Vitals:    10/11/22 0933   BP: 140/83   BP Location: Left arm   Patient Position: Sitting   Cuff Size: Adult   Pulse: 82   Temp: 96.9 °F (36.1 °C)   SpO2: 98%   Weight: 69.8 kg (153 lb 12.8 oz)   Height: 165.1 cm (65\")     Body mass index is 25.59 kg/m².  Physical Exam  Vitals and nursing note reviewed. "   Constitutional:       General: He is not in acute distress.     Appearance: He is well-developed. He is not diaphoretic.   Cardiovascular:      Rate and Rhythm: Normal rate and regular rhythm.   Pulmonary:      Effort: Pulmonary effort is normal. No respiratory distress.      Breath sounds: Normal breath sounds. No wheezing.           Diagnoses and all orders for this visit:    1. Dyslipidemia (Primary)  -     Comprehensive Metabolic Panel  -     Lipid Panel  -     CBC & Differential    2. Immunization due  -     FluLaval/Fluzone >6 mos ([id number])      Return in about 6 months (around 4/11/2023).

## 2022-10-28 ENCOUNTER — CLINICAL SUPPORT (OUTPATIENT)
Dept: FAMILY MEDICINE CLINIC | Facility: CLINIC | Age: 37
End: 2022-10-28

## 2022-10-28 DIAGNOSIS — Z23 IMMUNIZATION DUE: Primary | ICD-10-CM

## 2022-10-28 PROCEDURE — 91312 COVID-19 (PFIZER) BIVALENT BOOSTER 12+YRS: CPT | Performed by: FAMILY MEDICINE

## 2022-10-28 PROCEDURE — 0124A PR ADM SARSCOV2 30MCG/0.3ML BST: CPT | Performed by: FAMILY MEDICINE

## 2022-10-29 LAB
ALBUMIN SERPL-MCNC: 4.6 G/DL (ref 3.5–5.2)
ALBUMIN/GLOB SERPL: 1.8 G/DL
ALP SERPL-CCNC: 74 U/L (ref 39–117)
ALT SERPL-CCNC: 18 U/L (ref 1–41)
AST SERPL-CCNC: 22 U/L (ref 1–40)
BASOPHILS # BLD AUTO: 0.02 10*3/MM3 (ref 0–0.2)
BASOPHILS NFR BLD AUTO: 0.3 % (ref 0–1.5)
BILIRUB SERPL-MCNC: 0.8 MG/DL (ref 0–1.2)
BUN SERPL-MCNC: 10 MG/DL (ref 6–20)
BUN/CREAT SERPL: 8.5 (ref 7–25)
CALCIUM SERPL-MCNC: 9.5 MG/DL (ref 8.6–10.5)
CHLORIDE SERPL-SCNC: 104 MMOL/L (ref 98–107)
CHOLEST SERPL-MCNC: 188 MG/DL (ref 0–200)
CO2 SERPL-SCNC: 30.9 MMOL/L (ref 22–29)
CREAT SERPL-MCNC: 1.18 MG/DL (ref 0.76–1.27)
EGFRCR SERPLBLD CKD-EPI 2021: 82.5 ML/MIN/1.73
EOSINOPHIL # BLD AUTO: 0.17 10*3/MM3 (ref 0–0.4)
EOSINOPHIL NFR BLD AUTO: 3 % (ref 0.3–6.2)
ERYTHROCYTE [DISTWIDTH] IN BLOOD BY AUTOMATED COUNT: 13.1 % (ref 12.3–15.4)
GLOBULIN SER CALC-MCNC: 2.6 GM/DL
GLUCOSE SERPL-MCNC: 82 MG/DL (ref 65–99)
HCT VFR BLD AUTO: 49 % (ref 37.5–51)
HDLC SERPL-MCNC: 48 MG/DL (ref 40–60)
HGB BLD-MCNC: 16.1 G/DL (ref 13–17.7)
IMM GRANULOCYTES # BLD AUTO: 0.02 10*3/MM3 (ref 0–0.05)
IMM GRANULOCYTES NFR BLD AUTO: 0.3 % (ref 0–0.5)
LDLC SERPL CALC-MCNC: 126 MG/DL (ref 0–100)
LYMPHOCYTES # BLD AUTO: 2.25 10*3/MM3 (ref 0.7–3.1)
LYMPHOCYTES NFR BLD AUTO: 39.2 % (ref 19.6–45.3)
MCH RBC QN AUTO: 29.5 PG (ref 26.6–33)
MCHC RBC AUTO-ENTMCNC: 32.9 G/DL (ref 31.5–35.7)
MCV RBC AUTO: 89.9 FL (ref 79–97)
MONOCYTES # BLD AUTO: 0.52 10*3/MM3 (ref 0.1–0.9)
MONOCYTES NFR BLD AUTO: 9.1 % (ref 5–12)
NEUTROPHILS # BLD AUTO: 2.76 10*3/MM3 (ref 1.7–7)
NEUTROPHILS NFR BLD AUTO: 48.1 % (ref 42.7–76)
NRBC BLD AUTO-RTO: 0 /100 WBC (ref 0–0.2)
PLATELET # BLD AUTO: 272 10*3/MM3 (ref 140–450)
POTASSIUM SERPL-SCNC: 4.6 MMOL/L (ref 3.5–5.2)
PROT SERPL-MCNC: 7.2 G/DL (ref 6–8.5)
RBC # BLD AUTO: 5.45 10*6/MM3 (ref 4.14–5.8)
SODIUM SERPL-SCNC: 141 MMOL/L (ref 136–145)
TRIGL SERPL-MCNC: 76 MG/DL (ref 0–150)
VLDLC SERPL CALC-MCNC: 14 MG/DL (ref 5–40)
WBC # BLD AUTO: 5.74 10*3/MM3 (ref 3.4–10.8)

## 2023-03-31 ENCOUNTER — OFFICE VISIT (OUTPATIENT)
Dept: FAMILY MEDICINE CLINIC | Facility: CLINIC | Age: 38
End: 2023-03-31
Payer: COMMERCIAL

## 2023-03-31 VITALS
SYSTOLIC BLOOD PRESSURE: 119 MMHG | HEART RATE: 86 BPM | TEMPERATURE: 97.3 F | BODY MASS INDEX: 25.63 KG/M2 | OXYGEN SATURATION: 98 % | DIASTOLIC BLOOD PRESSURE: 83 MMHG | WEIGHT: 154 LBS

## 2023-03-31 DIAGNOSIS — E78.5 DYSLIPIDEMIA: ICD-10-CM

## 2023-03-31 DIAGNOSIS — Z23 ENCOUNTER FOR ADMINISTRATION OF VACCINE: ICD-10-CM

## 2023-03-31 DIAGNOSIS — Z00.00 ANNUAL PHYSICAL EXAM: Primary | ICD-10-CM

## 2023-03-31 DIAGNOSIS — Z11.3 SCREEN FOR STD (SEXUALLY TRANSMITTED DISEASE): ICD-10-CM

## 2023-03-31 PROBLEM — J06.9 UPPER RESPIRATORY TRACT INFECTION: Status: RESOLVED | Noted: 2020-04-28 | Resolved: 2023-03-31

## 2023-03-31 PROBLEM — H61.23 BILATERAL IMPACTED CERUMEN: Status: RESOLVED | Noted: 2021-06-15 | Resolved: 2023-03-31

## 2023-03-31 PROBLEM — R68.83 CHILLS: Status: RESOLVED | Noted: 2020-04-28 | Resolved: 2023-03-31

## 2023-03-31 NOTE — PROGRESS NOTES
"Chief Complaint  Dyslipidemia (New patient, patient would like to discuss biometric screening. )    Subjective        Emerson Rhodes Jr. presents to [facility]  History of Present Illness     Doing well today. Here today for check up. He had a biometric screening at work in February and his triglycerides were high. Has a weakness with sweets sometimes. Last triglycerides here were normal in October. Had stopped exercising as much. Had a death in the family right before the holidays and he was having a rough time. Is doing some better. Does see a therapist to help. Is currently sexually active, with female partners.     Objective   Vital Signs:  /83 (BP Location: Right arm, Patient Position: Sitting, Cuff Size: Large Adult)   Pulse 86   Temp 97.3 °F (36.3 °C)   Wt 69.9 kg (154 lb)   SpO2 98%   BMI 25.63 kg/m²   Estimated body mass index is 25.63 kg/m² as calculated from the following:    Height as of 10/11/22: 165.1 cm (65\").    Weight as of this encounter: 69.9 kg (154 lb).             Physical Exam  Vitals and nursing note reviewed.   Constitutional:       General: He is not in acute distress.     Appearance: Normal appearance. He is not ill-appearing.   HENT:      Head: Normocephalic and atraumatic.      Nose: Nose normal.      Mouth/Throat:      Mouth: Mucous membranes are moist.   Eyes:      Extraocular Movements: Extraocular movements intact.      Conjunctiva/sclera: Conjunctivae normal.   Cardiovascular:      Rate and Rhythm: Normal rate and regular rhythm.      Heart sounds: Normal heart sounds. No murmur heard.    No gallop.   Pulmonary:      Effort: Pulmonary effort is normal. No respiratory distress.      Breath sounds: Normal breath sounds. No stridor. No wheezing, rhonchi or rales.   Chest:      Chest wall: No tenderness.   Skin:     General: Skin is warm and dry.   Neurological:      General: No focal deficit present.      Mental Status: He is alert and oriented " to person, place, and time. Mental status is at baseline.   Psychiatric:         Mood and Affect: Mood normal.         Behavior: Behavior normal.         Thought Content: Thought content normal.         Judgment: Judgment normal.        Result Review :                   Assessment and Plan   Diagnoses and all orders for this visit:    1. Annual physical exam (Primary)  Comments:  Discussed preventative screenings and vaccines today.  Discussed diet and exercise.  Overall patient doing well.  We will repeat triglycerides today.  Orders:  -     Lipid panel  -     Tdap Vaccine Greater Than or Equal To 8yo IM  -     Comprehensive metabolic panel    2. Dyslipidemia  -     Lipid panel    3. Encounter for administration of vaccine  -     Tdap Vaccine Greater Than or Equal To 8yo IM    4. Screen for STD (sexually transmitted disease)  -     Chlamydia trachomatis, Neisseria gonorrhoeae, PCR - Urine, Urine, Random Void; Future  -     Hepatitis C RNA, quantitative, PCR (graph)  -     HSV 1 and 2-Specific Ab, IgG  -     RPR  -     HIV-1/O/2 ANTIGEN/ANTIBODY, 4TH GENERATION  -     Chlamydia trachomatis, Neisseria gonorrhoeae, PCR - Urine, Urine, Random Void             Follow Up   Return in about 6 months (around 9/30/2023).  Patient was given instructions and counseling regarding his condition or for health maintenance advice. Please see specific information pulled into the AVS if appropriate.

## 2023-04-03 LAB
ALBUMIN SERPL-MCNC: 4.4 G/DL (ref 4–5)
ALBUMIN/GLOB SERPL: 1.8 {RATIO} (ref 1.2–2.2)
ALP SERPL-CCNC: 69 IU/L (ref 44–121)
ALT SERPL-CCNC: 20 IU/L (ref 0–44)
AST SERPL-CCNC: 19 IU/L (ref 0–40)
BILIRUB SERPL-MCNC: 0.5 MG/DL (ref 0–1.2)
BUN SERPL-MCNC: 11 MG/DL (ref 6–20)
BUN/CREAT SERPL: 9 (ref 9–20)
CALCIUM SERPL-MCNC: 9.6 MG/DL (ref 8.7–10.2)
CHLORIDE SERPL-SCNC: 102 MMOL/L (ref 96–106)
CHOLEST SERPL-MCNC: 155 MG/DL (ref 100–199)
CO2 SERPL-SCNC: 23 MMOL/L (ref 20–29)
CREAT SERPL-MCNC: 1.21 MG/DL (ref 0.76–1.27)
EGFRCR SERPLBLD CKD-EPI 2021: 80 ML/MIN/1.73
GLOBULIN SER CALC-MCNC: 2.5 G/DL (ref 1.5–4.5)
GLUCOSE SERPL-MCNC: 82 MG/DL (ref 70–99)
HCV RNA SERPL NAA+PROBE-ACNC: NORMAL IU/ML
HDLC SERPL-MCNC: 42 MG/DL
HIV 1+2 AB+HIV1 P24 AG SERPL QL IA: NON REACTIVE
HSV1 IGG SER IA-ACNC: <0.91 INDEX (ref 0–0.9)
HSV2 IGG SER IA-ACNC: <0.91 INDEX (ref 0–0.9)
LDLC SERPL CALC-MCNC: 102 MG/DL (ref 0–99)
POTASSIUM SERPL-SCNC: 4.2 MMOL/L (ref 3.5–5.2)
PROT SERPL-MCNC: 6.9 G/DL (ref 6–8.5)
RPR SER QL: NON REACTIVE
SODIUM SERPL-SCNC: 141 MMOL/L (ref 134–144)
TEST INFORMATION: NORMAL
TRIGL SERPL-MCNC: 53 MG/DL (ref 0–149)
VLDLC SERPL CALC-MCNC: 11 MG/DL (ref 5–40)

## 2023-04-04 LAB
C TRACH RRNA SPEC QL NAA+PROBE: NEGATIVE
N GONORRHOEA RRNA SPEC QL NAA+PROBE: NEGATIVE

## 2023-04-06 ENCOUNTER — PATIENT ROUNDING (BHMG ONLY) (OUTPATIENT)
Dept: FAMILY MEDICINE CLINIC | Facility: CLINIC | Age: 38
End: 2023-04-06
Payer: COMMERCIAL

## 2023-04-06 NOTE — PROGRESS NOTES
• A My-Chart message has been sent to the patient for PATIENT ROUNDING with [facility]

## 2024-09-20 ENCOUNTER — OFFICE VISIT (OUTPATIENT)
Dept: FAMILY MEDICINE CLINIC | Facility: CLINIC | Age: 39
End: 2024-09-20
Payer: COMMERCIAL

## 2024-09-20 VITALS
BODY MASS INDEX: 28.32 KG/M2 | DIASTOLIC BLOOD PRESSURE: 82 MMHG | OXYGEN SATURATION: 99 % | WEIGHT: 170 LBS | HEART RATE: 86 BPM | TEMPERATURE: 97.8 F | HEIGHT: 65 IN | SYSTOLIC BLOOD PRESSURE: 122 MMHG

## 2024-09-20 DIAGNOSIS — G43.809 OTHER MIGRAINE WITHOUT STATUS MIGRAINOSUS, NOT INTRACTABLE: ICD-10-CM

## 2024-09-20 DIAGNOSIS — E78.5 DYSLIPIDEMIA: ICD-10-CM

## 2024-09-20 DIAGNOSIS — Z00.00 ANNUAL PHYSICAL EXAM: Primary | ICD-10-CM

## 2024-09-20 DIAGNOSIS — Z72.51 HIGH RISK SEXUAL BEHAVIOR, UNSPECIFIED TYPE: ICD-10-CM

## 2024-09-20 PROCEDURE — 99395 PREV VISIT EST AGE 18-39: CPT | Performed by: STUDENT IN AN ORGANIZED HEALTH CARE EDUCATION/TRAINING PROGRAM
